# Patient Record
Sex: FEMALE | Race: WHITE | URBAN - NONMETROPOLITAN AREA
[De-identification: names, ages, dates, MRNs, and addresses within clinical notes are randomized per-mention and may not be internally consistent; named-entity substitution may affect disease eponyms.]

---

## 2020-10-14 ENCOUNTER — TELEPHONE (OUTPATIENT)
Dept: OBGYN CLINIC | Age: 30
End: 2020-10-14

## 2020-10-14 ENCOUNTER — HOSPITAL ENCOUNTER (OUTPATIENT)
Age: 30
Setting detail: SPECIMEN
Discharge: HOME | End: 2020-10-14
Payer: COMMERCIAL

## 2020-10-14 ENCOUNTER — OFFICE VISIT (OUTPATIENT)
Dept: OBGYN CLINIC | Age: 30
End: 2020-10-14
Payer: COMMERCIAL

## 2020-10-14 VITALS
DIASTOLIC BLOOD PRESSURE: 75 MMHG | SYSTOLIC BLOOD PRESSURE: 134 MMHG | WEIGHT: 201.6 LBS | BODY MASS INDEX: 30.55 KG/M2 | HEIGHT: 68 IN

## 2020-10-14 DIAGNOSIS — N94.6 DYSMENORRHEA: Primary | ICD-10-CM

## 2020-10-14 LAB
HCG QUANTITATIVE: <1 IU/L
PROLACTIN: 92.57 UG/L (ref 4.79–23.3)
TSH SERPL DL<=0.05 MIU/L-ACNC: 1.19 MIU/L (ref 0.3–5)

## 2020-10-14 PROCEDURE — 99395 PREV VISIT EST AGE 18-39: CPT | Performed by: NURSE PRACTITIONER

## 2020-10-14 RX ORDER — NORGESTIMATE AND ETHINYL ESTRADIOL 0.25-0.035
1 KIT ORAL DAILY
COMMUNITY
End: 2021-05-13 | Stop reason: ALTCHOICE

## 2020-10-14 SDOH — HEALTH STABILITY: MENTAL HEALTH: HOW OFTEN DO YOU HAVE A DRINK CONTAINING ALCOHOL?: MONTHLY OR LESS

## 2020-10-14 ASSESSMENT — PATIENT HEALTH QUESTIONNAIRE - PHQ9
SUM OF ALL RESPONSES TO PHQ9 QUESTIONS 1 & 2: 0
SUM OF ALL RESPONSES TO PHQ QUESTIONS 1-9: 0
1. LITTLE INTEREST OR PLEASURE IN DOING THINGS: 0
2. FEELING DOWN, DEPRESSED OR HOPELESS: 0
SUM OF ALL RESPONSES TO PHQ QUESTIONS 1-9: 0

## 2020-10-14 NOTE — PROGRESS NOTES
YEARLY PHYSICAL    Date of service: 10/14/2020    Raul Andrade  Is a 31 y.o.  female    PT's PCP is: No primary care provider on file. : 1990                                             Subjective:       Patient's last menstrual period was 10/03/2020 (approximate). Are your menses regular: yes    OB History    Para Term  AB Living   0 0 0 0 0 0   SAB TAB Ectopic Molar Multiple Live Births   0 0 0 0 0 0        Social History     Tobacco Use   Smoking Status Never Smoker   Smokeless Tobacco Never Used        Social History     Substance and Sexual Activity   Alcohol Use Yes    Frequency: Monthly or less       Family History   Problem Relation Age of Onset    Hypertension Maternal Grandmother        Any family history of breast or ovarian cancer: No    Any family history of blood clots: No      Allergies: No known allergies      Current Outpatient Medications:     Nebivolol HCl (BYSTOLIC PO), Take by mouth, Disp: , Rfl:     norgestimate-ethinyl estradiol ([address] [address]) 0.25-35 MG-MCG per tablet, Take 1 tablet by mouth daily, Disp: , Rfl:     Social History     Substance and Sexual Activity   Sexual Activity Yes    Partners: Male    Birth control/protection: Pill     Last Yearly:  10/2019    Last pap: 10/2019    Last HPV: 10/2019    Last Mammogram: n/a    Last Dexascan n/a    Do you do self breast exams: Yes    Past Medical History:   Diagnosis Date    Abnormal Pap smear of cervix , 2018    HGSIL on 2017 colpo    HPV (human papilloma virus) anogenital infection     Hypertension        Past Surgical History:   Procedure Laterality Date    LEEP         Family History   Problem Relation Age of Onset    Hypertension Maternal Grandmother        Chief Complaint   Patient presents with    Gynecologic Exam     Pap due. C/O bilateral nipple discharge that is white/clear in color x 8 mos. Denies pain.           PE: Masses absent                                    Vaginal discharge: no vaginal discharge                            Assessment and Plan          Diagnosis Orders   1. Women's annual routine gynecological examination  PAP SMEAR   2. Galactorrhea not associated with childbirth  TSH With Reflex Ft4    hCG, Quantitative, Pregnancy    Prolactin    PAP Smear    PAP Smear    US BREAST COMPLETE RIGHT    US BREASt COMPLETE LEFT   3. Dysmenorrhea         Patient has complaints of bilateral spontaneous nipple discharge x8 months. Discussed further evaluation with labs, breast paps collected and will be sent to pathology, bilateral breast usn ordered. Patient agreeable to plan. Discussed avoiding all stimulation to breast.     Patient recently diagnosed with HTN. Discussed LILO's MEC category 3 and risks associated with use likely outweigh benefits. Discussed progesterone only options including POP's, Depo, nexplanon, or IUD. Patient would like to move forth with IUD. Discussed procedure, risks/benefits, bleeding patterns. I am having Chuy Duncan maintain her Nebivolol HCl (BYSTOLIC PO) and norgestimate-ethinyl estradiol. Return in about 1 year (around 10/14/2021) for yearly, needs bilateral breast usn for nipple discharge!! . She was also counseled on her preventative health maintenance recommendations and follow-up. There are no Patient Instructions on file for this visit.     Juvenal Rojas,10/14/2020 9:25 AM

## 2020-10-15 ENCOUNTER — TELEPHONE (OUTPATIENT)
Dept: OBGYN CLINIC | Age: 30
End: 2020-10-15

## 2020-10-15 NOTE — TELEPHONE ENCOUNTER
Can you please send order for brain MRI with contrast to [facility].  Patient with hyperprolactinemia and galactorrhea

## 2020-10-15 NOTE — TELEPHONE ENCOUNTER
Pt signed the mirena/antena form. Your note says she wants Eva Mcgregor. Can you please confirm which one.

## 2020-10-15 NOTE — TELEPHONE ENCOUNTER
Benefit verification request faxed to [facility].   If we order through specialty pharm she will need to sign a  liletta form at that time

## 2020-10-16 LAB
CYTOLOGY REPORT: NORMAL
SURGICAL PATHOLOGY REPORT: NORMAL

## 2020-10-22 RX ORDER — ACETAMINOPHEN AND CODEINE PHOSPHATE 120; 12 MG/5ML; MG/5ML
1 SOLUTION ORAL DAILY
Qty: 28 TABLET | Refills: 12 | Status: SHIPPED | OUTPATIENT
Start: 2020-10-22 | End: 2021-08-19 | Stop reason: SDUPTHER

## 2020-10-22 NOTE — TELEPHONE ENCOUNTER
I called pt and informed her that rx was sent to the pharm. Instructed her to finish current pack and then start new and to take at same time each day.

## 2020-10-22 NOTE — TELEPHONE ENCOUNTER
Fax received from [facility]. Pt's deductible of $250 applies. After deductible is met device and insert covered at 80%. I called pt and informed her that her estimated out of pocket cost would be approx $550. Pt. Voiced that she wants to do a pill instead. What do you recommend? She expects to start a cycle in about 4 days. [facility] in [facility] In your  Visit note you mentioned a progesterone only pill because of HTN.

## 2020-11-03 ENCOUNTER — TELEPHONE (OUTPATIENT)
Dept: OBGYN | Age: 30
End: 2020-11-03

## 2020-11-03 DIAGNOSIS — R79.89 ELEVATED PROLACTIN LEVEL: Primary | ICD-10-CM

## 2020-11-03 NOTE — TELEPHONE ENCOUNTER
Order faxed.
Have Your Spot(S) Been Treated In The Past?: has not been treated
Hpi Title: Evaluation of a Skin Lesion
Year Removed: 1900
Hpi Title: Evaluation of Skin Lesions
Year Removed: 1900

## 2020-12-10 ENCOUNTER — TELEPHONE (OUTPATIENT)
Dept: OBGYN CLINIC | Age: 30
End: 2020-12-10

## 2020-12-10 NOTE — TELEPHONE ENCOUNTER
Please notify patient of results and apologize for delay. Results were never faxed here, we had to request them. Finding of brain MRI are suspicious for cystic pituitary microadenoma- which can be reason for nipple discharge and elevated prolactin. I would like her to start with referral to endocrinology for follow up and management. Please fax referral to Dr. Dyana Thompson. Thanks!

## 2021-05-13 ENCOUNTER — HOSPITAL ENCOUNTER (OUTPATIENT)
Age: 31
Setting detail: SPECIMEN
Discharge: HOME | End: 2021-05-13
Payer: COMMERCIAL

## 2021-05-13 ENCOUNTER — OFFICE VISIT (OUTPATIENT)
Dept: OBGYN CLINIC | Age: 31
End: 2021-05-13
Payer: COMMERCIAL

## 2021-05-13 VITALS — SYSTOLIC BLOOD PRESSURE: 124 MMHG | WEIGHT: 199.4 LBS | BODY MASS INDEX: 30.32 KG/M2 | DIASTOLIC BLOOD PRESSURE: 81 MMHG

## 2021-05-13 DIAGNOSIS — N93.9 ABNORMAL UTERINE AND VAGINAL BLEEDING, UNSPECIFIED: ICD-10-CM

## 2021-05-13 DIAGNOSIS — N93.9 ABNORMAL UTERINE AND VAGINAL BLEEDING, UNSPECIFIED: Primary | ICD-10-CM

## 2021-05-13 LAB — HCG QUANTITATIVE: <1 IU/L

## 2021-05-13 PROCEDURE — 1036F TOBACCO NON-USER: CPT | Performed by: NURSE PRACTITIONER

## 2021-05-13 PROCEDURE — 99213 OFFICE O/P EST LOW 20 MIN: CPT | Performed by: NURSE PRACTITIONER

## 2021-05-13 PROCEDURE — G8427 DOCREV CUR MEDS BY ELIG CLIN: HCPCS | Performed by: NURSE PRACTITIONER

## 2021-05-13 PROCEDURE — G8417 CALC BMI ABV UP PARAM F/U: HCPCS | Performed by: NURSE PRACTITIONER

## 2021-05-13 RX ORDER — CABERGOLINE 0.5 MG/1
TABLET ORAL
COMMUNITY
Start: 2021-05-03

## 2021-05-13 NOTE — PROGRESS NOTES
PROBLEM VISIT     Date of service: 2021    Patrick Tiwari  Is a 36 y.o.  female    PT's PCP is: No primary care provider on file. : 1990                                             Subjective:       Patient's last menstrual period was 2021. OB History    Para Term  AB Living   0 0 0 0 0 0   SAB TAB Ectopic Molar Multiple Live Births   0 0 0 0 0 0        Social History     Tobacco Use   Smoking Status Never Smoker   Smokeless Tobacco Never Used        Social History     Substance and Sexual Activity   Alcohol Use Yes    Frequency: Monthly or less       Allergies: No known allergies      Current Outpatient Medications:     cabergoline (DOSTINEX) 0.5 MG tablet, , Disp: , Rfl:     norethindrone (ORTHO MICRONOR) 0.35 MG tablet, Take 1 tablet by mouth daily, Disp: 28 tablet, Rfl: 12    Social History     Substance and Sexual Activity   Sexual Activity Yes    Partners: Male    Birth control/protection: Pill       Chief Complaint   Patient presents with    Menstrual Problem     C/O frequent menses stattes will randomly have bleeding, sometimes heavy, sometimes light and sometimes just spotting. States onset was around the time of starting Cabergoline. PE:  Vital Signs  Blood pressure 124/81, weight 199 lb 6.4 oz (90.4 kg), last menstrual period 2021. HPI: Patient presents today with complaints of irregular vaginal bleeding for the past 3 months. Unsure if the irregular bleeding is related to Cabergoline as it started around the same time she started the medication. Denies any s/s of vaginal infection or change in sexual partners. Admits to skipping POP's if she starts bleeding in middle of the pack. She will then just start a new pack. PT denies fever, chills, nausea and vomiting       Review of Systems   Constitutional: Negative. Respiratory: Negative. Cardiovascular: Negative. Genitourinary: Positive for menstrual problem.  Negative for dyspareunia, dysuria, frequency, pelvic pain and vaginal discharge. Neurological: Negative. Physical Exam  Constitutional:       Appearance: Normal appearance. HENT:      Head: Normocephalic. Pulmonary:      Effort: Pulmonary effort is normal.   Musculoskeletal: Normal range of motion. Neurological:      General: No focal deficit present. Mental Status: She is alert. Psychiatric:         Mood and Affect: Mood normal.         Behavior: Behavior normal.         Assessment and Plan          Diagnosis Orders   1. Abnormal uterine and vaginal bleeding, unspecified  hCG, Quantitative, Pregnancy     Discussed irregular bleeding is a common side effect of POP's when not taken consistently. With normal usn will consider switching POP's back to LILO's as patient is not taking any medication to control BP and was only on antihypertensives x1 month. BP is appropriate today. I have discontinued Sangita Bernarda Nebivolol HCl (BYSTOLIC PO) and norgestimate-ethinyl estradiol. I am also having her maintain her norethindrone and cabergoline. Return for gyn US and follow up . There are no Patient Instructions on file for this visit.     Kadi Rojas,5/14/2021 8:32 AM

## 2021-05-14 ASSESSMENT — ENCOUNTER SYMPTOMS: RESPIRATORY NEGATIVE: 1

## 2021-05-18 ENCOUNTER — OFFICE VISIT (OUTPATIENT)
Dept: OBGYN CLINIC | Age: 31
End: 2021-05-18
Payer: COMMERCIAL

## 2021-05-18 VITALS — WEIGHT: 199.4 LBS | BODY MASS INDEX: 30.32 KG/M2 | SYSTOLIC BLOOD PRESSURE: 108 MMHG | DIASTOLIC BLOOD PRESSURE: 71 MMHG

## 2021-05-18 DIAGNOSIS — N94.6 DYSMENORRHEA: Primary | ICD-10-CM

## 2021-05-18 PROCEDURE — G8427 DOCREV CUR MEDS BY ELIG CLIN: HCPCS | Performed by: NURSE PRACTITIONER

## 2021-05-18 PROCEDURE — 1036F TOBACCO NON-USER: CPT | Performed by: NURSE PRACTITIONER

## 2021-05-18 PROCEDURE — G8417 CALC BMI ABV UP PARAM F/U: HCPCS | Performed by: NURSE PRACTITIONER

## 2021-05-18 PROCEDURE — 99213 OFFICE O/P EST LOW 20 MIN: CPT | Performed by: NURSE PRACTITIONER

## 2021-05-18 RX ORDER — NORGESTIMATE AND ETHINYL ESTRADIOL 0.25-0.035
1 KIT ORAL DAILY
Qty: 1 PACKET | Refills: 3 | Status: SHIPPED | OUTPATIENT
Start: 2021-05-18 | End: 2021-09-13

## 2021-05-18 ASSESSMENT — ENCOUNTER SYMPTOMS: RESPIRATORY NEGATIVE: 1

## 2021-05-18 NOTE — PROGRESS NOTES
PROBLEM VISIT     Date of service: 2021    Olesya Sinclair  Is a 36 y.o. female    PT's PCP is: No primary care provider on file. : 1990                                             Subjective:       Patient's last menstrual period was 2021. OB History    Para Term  AB Living   0 0 0 0 0 0   SAB TAB Ectopic Molar Multiple Live Births   0 0 0 0 0 0        Social History     Tobacco Use   Smoking Status Never Smoker   Smokeless Tobacco Never Used        Social History     Substance and Sexual Activity   Alcohol Use Yes       Allergies: No known allergies      Current Outpatient Medications:     cabergoline (DOSTINEX) 0.5 MG tablet, , Disp: , Rfl:     norethindrone (ORTHO MICRONOR) 0.35 MG tablet, Take 1 tablet by mouth daily, Disp: 28 tablet, Rfl: 12    Social History     Substance and Sexual Activity   Sexual Activity Yes    Partners: Male    Birth control/protection: Pill     Chief Complaint   Patient presents with    Follow-up     Follow up usn for AUB. PE:  Vital Signs  Blood pressure 108/71, weight 199 lb 6.4 oz (90.4 kg), last menstrual period 2021. HPI: Patient presents following ultrasound for AUB. Reports irregular bleeding on POP's for the past 3 months. The amount of vaginal bleeding varies from spotting to heavy. Was skipping last week in pill pack. PT denies fever, chills, nausea and vomiting       Review of Systems   Constitutional: Negative. Respiratory: Negative. Cardiovascular: Negative. Genitourinary: Positive for menstrual problem. Negative for dyspareunia, dysuria, frequency, pelvic pain, vaginal bleeding and vaginal discharge. Neurological: Negative. Physical Exam  Constitutional:       Appearance: Normal appearance. HENT:      Head: Normocephalic. Cardiovascular:      Rate and Rhythm: Normal rate and regular rhythm.    Pulmonary:      Effort: Pulmonary effort is normal.   Musculoskeletal:         General: Normal range of motion. Neurological:      General: No focal deficit present. Mental Status: She is alert. Psychiatric:         Mood and Affect: Mood normal.         Behavior: Behavior normal.         Assessment and Plan          Diagnosis Orders   1. Dysmenorrhea  norgestimate-ethinyl estradiol (ORTHO-CYCLEN, 28,) 0.25-35 MG-MCG per tablet     usn reviewed- uterus measures 7.9 x 4.5 x 3.9cm, endo lining 7.3SH, dominant follicle left ovary, right ovaries appear normal.     Will return to OCP as her BP has remained stable following discontinuation of antihypertensive. States she only took x1 month. Again today her BP is appropriate. Reviewed risks/benefits, administration, common side effects and bleeding patters, aches. Will finish current pack and then start [address] [place] [address]. I am having Andrea Pedroza start on norgestimate-ethinyl estradiol. I am also having her maintain her norethindrone and cabergoline. Return in about 3 months (around 8/18/2021). There are no Patient Instructions on file for this visit.     NANCY Kirkland NP,5/18/2021 8:36 PM

## 2021-08-19 ENCOUNTER — OFFICE VISIT (OUTPATIENT)
Dept: OBGYN CLINIC | Age: 31
End: 2021-08-19
Payer: COMMERCIAL

## 2021-08-19 VITALS — WEIGHT: 193.2 LBS | DIASTOLIC BLOOD PRESSURE: 77 MMHG | BODY MASS INDEX: 29.38 KG/M2 | SYSTOLIC BLOOD PRESSURE: 120 MMHG

## 2021-08-19 DIAGNOSIS — N94.6 DYSMENORRHEA: Primary | ICD-10-CM

## 2021-08-19 PROCEDURE — G8427 DOCREV CUR MEDS BY ELIG CLIN: HCPCS | Performed by: NURSE PRACTITIONER

## 2021-08-19 PROCEDURE — 1036F TOBACCO NON-USER: CPT | Performed by: NURSE PRACTITIONER

## 2021-08-19 PROCEDURE — 99213 OFFICE O/P EST LOW 20 MIN: CPT | Performed by: NURSE PRACTITIONER

## 2021-08-19 PROCEDURE — G8417 CALC BMI ABV UP PARAM F/U: HCPCS | Performed by: NURSE PRACTITIONER

## 2021-08-19 ASSESSMENT — ENCOUNTER SYMPTOMS
GASTROINTESTINAL NEGATIVE: 1
RESPIRATORY NEGATIVE: 1

## 2021-08-20 NOTE — PROGRESS NOTES
PROBLEM VISIT     Date of service: 2021    Mynor Ronquillo  Is a 36 y.o. female    PT's PCP is: No primary care provider on file. : 1990                                             Subjective:       Patient's last menstrual period was 2021. OB History    Para Term  AB Living   0 0 0 0 0 0   SAB TAB Ectopic Molar Multiple Live Births   0 0 0 0 0 0        Social History     Tobacco Use   Smoking Status Never Smoker   Smokeless Tobacco Never Used        Social History     Substance and Sexual Activity   Alcohol Use Yes       Allergies: No known allergies      Current Outpatient Medications:     norgestimate-ethinyl estradiol (ORTHO-CYCLEN, 28,) 0.25-35 MG-MCG per tablet, Take 1 tablet by mouth daily, Disp: 1 packet, Rfl: 3    cabergoline (DOSTINEX) 0.5 MG tablet, , Disp: , Rfl:     Social History     Substance and Sexual Activity   Sexual Activity Yes    Partners: Male    Birth control/protection: Pill     Chief Complaint   Patient presents with    Follow-up     Follow up sprintec for dysmenorrhea and AUB. Reports feeling well and everything is pretty much back to normal. Voices no concerns. PE:  Vital Signs  Blood pressure 120/77, weight 193 lb 3.2 oz (87.6 kg), last menstrual period 2021. HPI: Patient presents today for medication check. Reports irregular spotting and cramping improved with Sprintec. Denies any concerns. PT denies fever, chills, nausea and vomiting       Review of Systems   Constitutional: Negative. Respiratory: Negative. Cardiovascular: Negative. Gastrointestinal: Negative. Genitourinary: Negative. Neurological: Negative. Physical Exam  Constitutional:       Appearance: Normal appearance. HENT:      Head: Normocephalic. Pulmonary:      Effort: Pulmonary effort is normal.   Musculoskeletal:         General: Normal range of motion. Neurological:      General: No focal deficit present.       Mental Status:

## 2021-09-13 DIAGNOSIS — N94.6 DYSMENORRHEA: ICD-10-CM

## 2021-11-09 DIAGNOSIS — N94.6 DYSMENORRHEA: ICD-10-CM

## 2021-11-09 RX ORDER — NORGESTIMATE AND ETHINYL ESTRADIOL 0.25-0.035
KIT ORAL
Qty: 28 TABLET | Refills: 1 | Status: CANCELLED | OUTPATIENT
Start: 2021-11-09

## 2021-12-06 DIAGNOSIS — N94.6 DYSMENORRHEA: ICD-10-CM

## 2021-12-06 RX ORDER — NORGESTIMATE AND ETHINYL ESTRADIOL 0.25-0.035
1 KIT ORAL DAILY
Qty: 28 TABLET | Refills: 0 | Status: SHIPPED | OUTPATIENT
Start: 2021-12-06 | End: 2021-12-23 | Stop reason: SDUPTHER

## 2021-12-23 ENCOUNTER — OFFICE VISIT (OUTPATIENT)
Dept: OBGYN CLINIC | Age: 31
End: 2021-12-23
Payer: COMMERCIAL

## 2021-12-23 VITALS
DIASTOLIC BLOOD PRESSURE: 76 MMHG | WEIGHT: 191 LBS | SYSTOLIC BLOOD PRESSURE: 126 MMHG | HEIGHT: 68 IN | BODY MASS INDEX: 28.95 KG/M2

## 2021-12-23 DIAGNOSIS — Z01.419 WOMEN'S ANNUAL ROUTINE GYNECOLOGICAL EXAMINATION: Primary | ICD-10-CM

## 2021-12-23 DIAGNOSIS — N94.6 DYSMENORRHEA: ICD-10-CM

## 2021-12-23 PROCEDURE — 99395 PREV VISIT EST AGE 18-39: CPT | Performed by: NURSE PRACTITIONER

## 2021-12-23 PROCEDURE — G8484 FLU IMMUNIZE NO ADMIN: HCPCS | Performed by: NURSE PRACTITIONER

## 2021-12-23 RX ORDER — NORGESTIMATE AND ETHINYL ESTRADIOL 0.25-0.035
1 KIT ORAL DAILY
Qty: 28 TABLET | Refills: 11 | Status: SHIPPED | OUTPATIENT
Start: 2021-12-23 | End: 2022-10-03

## 2021-12-23 ASSESSMENT — ENCOUNTER SYMPTOMS
CONSTIPATION: 0
DIARRHEA: 0
ABDOMINAL PAIN: 0
SHORTNESS OF BREATH: 0

## 2021-12-23 NOTE — PROGRESS NOTES
YEARLY PHYSICAL    Date of service: 2021    Coby Najera  Is a 36 y.o.  engaged female    PT's PCP is: No primary care provider on file. : 1990                                         Chaperone for Intimate Exam   Chaperone was offered as part of the rooming process. Patient declined and agrees to continue with exam without a chaperone.  Chaperone: n/a      Subjective:       Patient's last menstrual period was 2021 (approximate). Are your menses regular: yes    OB History    Para Term  AB Living   0 0 0 0 0 0   SAB IAB Ectopic Molar Multiple Live Births   0 0 0 0 0 0        Social History     Tobacco Use   Smoking Status Never Smoker   Smokeless Tobacco Never Used        Social History     Substance and Sexual Activity   Alcohol Use Yes    Comment: very rarely       Family History   Problem Relation Age of Onset    Hypertension Maternal Grandmother        Any family history of breast or ovarian cancer: No    Any family history of blood clots: No      Allergies: No known allergies      Current Outpatient Medications:     norgestimate-ethinyl estradiol (SPRINTEC 28) 0.25-35 MG-MCG per tablet, Take 1 tablet by mouth daily, Disp: 28 tablet, Rfl: 11    cabergoline (DOSTINEX) 0.5 MG tablet, , Disp: , Rfl:     Social History     Substance and Sexual Activity   Sexual Activity Yes    Partners: Male    Birth control/protection: Pill       Any bleeding or pain with intercourse: No    Last Yearly:  10-14-20    Last pap: 10-14-20 NL    Do you do self breast exams: No    Past Medical History:   Diagnosis Date    Abnormal Pap smear of cervix ,     HGSIL on 2017 colpo    Cyst of pituitary gland ([facility] [address]     gets yearly MRI. Dr. Anson Macario follows.     HPV (human papilloma virus) anogenital infection     Hypertension        Past Surgical History:   Procedure Laterality Date    LEEP         Family General: Normal range of motion. Neurological:      General: No focal deficit present. Mental Status: She is alert. Psychiatric:         Mood and Affect: Mood normal.         Behavior: Behavior normal.                          Assessment and Plan          Diagnosis Orders   1. Women's annual routine gynecological examination     2. Dysmenorrhea  norgestimate-ethinyl estradiol (SPRINTEC 28) 0.25-35 MG-MCG per tablet       Repeat Annual every 1 year  Cervical Cytology Evaluation begins at 24years old. If Negative Cytology, Follow-up screening per current guidelines. Mammograms every 1year. If 35 yo and last mammogram was negative. Routine healthmaintenance per patients PCP. Happy with current OCP, desires refill. Reviewed risks/benefits, aches       I am having Red Azeri maintain her cabergoline and norgestimate-ethinyl estradiol. Return in about 1 year (around 12/23/2022) for yearly. She was also counseled on her preventative health maintenance recommendations and follow-up. There are no Patient Instructions on file for this visit.     NANCY Painter NP,12/23/2021 8:37 AM

## 2022-10-03 DIAGNOSIS — N94.6 DYSMENORRHEA: ICD-10-CM

## 2023-03-21 ENCOUNTER — HOSPITAL ENCOUNTER (OUTPATIENT)
Age: 33
Setting detail: SPECIMEN
Discharge: HOME | End: 2023-03-21

## 2023-03-21 ENCOUNTER — OFFICE VISIT (OUTPATIENT)
Dept: OBGYN CLINIC | Age: 33
End: 2023-03-21
Payer: COMMERCIAL

## 2023-03-21 VITALS
BODY MASS INDEX: 29.1 KG/M2 | WEIGHT: 192 LBS | SYSTOLIC BLOOD PRESSURE: 112 MMHG | HEIGHT: 68 IN | DIASTOLIC BLOOD PRESSURE: 62 MMHG

## 2023-03-21 DIAGNOSIS — Z01.419 WOMEN'S ANNUAL ROUTINE GYNECOLOGICAL EXAMINATION: Primary | ICD-10-CM

## 2023-03-21 PROCEDURE — 99395 PREV VISIT EST AGE 18-39: CPT | Performed by: NURSE PRACTITIONER

## 2023-03-21 PROCEDURE — G8484 FLU IMMUNIZE NO ADMIN: HCPCS | Performed by: NURSE PRACTITIONER

## 2023-03-21 ASSESSMENT — ENCOUNTER SYMPTOMS
SHORTNESS OF BREATH: 0
ABDOMINAL PAIN: 0
CONSTIPATION: 0
DIARRHEA: 0

## 2023-03-21 NOTE — PROGRESS NOTES
tenderness. Left: No mass, nipple discharge, skin change or tenderness. HENT:      Head: Normocephalic and atraumatic. Eyes:      Extraocular Movements: Extraocular movements intact. Pupils: Pupils are equal, round, and reactive to light. Cardiovascular:      Rate and Rhythm: Normal rate. Pulmonary:      Effort: Pulmonary effort is normal.   Abdominal:      Palpations: Abdomen is soft. Tenderness: There is no abdominal tenderness. There is no guarding or rebound. Musculoskeletal:         General: Normal range of motion. Neurological:      General: No focal deficit present. Mental Status: She is alert and oriented to person, place, and time. Skin:     General: Skin is warm and dry. Psychiatric:         Mood and Affect: Mood normal.         Behavior: Behavior normal.                           Assessment & Plan  1. Women's annual routine gynecological examination  - PAP SMEAR; Future  Repeat Annual every 1 year  Cervical Cytology Evaluation begins at 24years old. If Negative Cytology, Follow-up screening per current guidelines. Mammograms every 1year. If 35 yo and last mammogram was negative. Routine healthmaintenance per patients PCP. Return in about 1 year (around 3/21/2024) for yearly.     Electronically Signed by NANCY Nielson NP

## 2023-03-23 LAB
HPV SAMPLE: NORMAL
HPV, GENOTYPE 16: NOT DETECTED
HPV, GENOTYPE 18: NOT DETECTED
HPV, HIGH RISK OTHER: NOT DETECTED
HPV, INTERPRETATION: NORMAL
SPECIMEN DESCRIPTION: NORMAL

## 2023-03-28 LAB — CYTOLOGY REPORT: NORMAL

## 2023-04-18 ENCOUNTER — HOSPITAL ENCOUNTER (OUTPATIENT)
Age: 33
Setting detail: SPECIMEN
Discharge: HOME | End: 2023-04-18

## 2023-04-18 DIAGNOSIS — Z32.01 PREGNANCY TEST POSITIVE: ICD-10-CM

## 2023-04-18 LAB — HCG QUANTITATIVE: <1 MIU/ML

## 2023-10-10 ENCOUNTER — OFFICE VISIT (OUTPATIENT)
Dept: OBGYN CLINIC | Age: 33
End: 2023-10-10
Payer: COMMERCIAL

## 2023-10-10 VITALS
SYSTOLIC BLOOD PRESSURE: 115 MMHG | BODY MASS INDEX: 28.04 KG/M2 | HEIGHT: 68 IN | WEIGHT: 185 LBS | DIASTOLIC BLOOD PRESSURE: 72 MMHG

## 2023-10-10 DIAGNOSIS — N91.1 AMENORRHEA, SECONDARY: Primary | ICD-10-CM

## 2023-10-10 PROCEDURE — 99213 OFFICE O/P EST LOW 20 MIN: CPT | Performed by: NURSE PRACTITIONER

## 2023-10-10 PROCEDURE — G8419 CALC BMI OUT NRM PARAM NOF/U: HCPCS | Performed by: NURSE PRACTITIONER

## 2023-10-10 PROCEDURE — 1036F TOBACCO NON-USER: CPT | Performed by: NURSE PRACTITIONER

## 2023-10-10 PROCEDURE — G8484 FLU IMMUNIZE NO ADMIN: HCPCS | Performed by: NURSE PRACTITIONER

## 2023-10-10 PROCEDURE — G8427 DOCREV CUR MEDS BY ELIG CLIN: HCPCS | Performed by: NURSE PRACTITIONER

## 2023-10-10 NOTE — PROGRESS NOTES
PROBLEM VISIT     Date of service: 10/10/2023    Karen Malcolm  Is a 34 y.o. female    PT's PCP is: No primary care provider on file. : 1990                                             Subjective:       Patient's last menstrual period was 08/10/2023. OB History    Para Term  AB Living   2 0 0 0 1 0   SAB IAB Ectopic Molar Multiple Live Births   1 0 0 0 0 0      # Outcome Date GA Lbr Ugo/2nd Weight Sex Delivery Anes PTL Lv   2 Current            1 SAB 2023                Social History     Tobacco Use   Smoking Status Former    Types: Cigarettes   Smokeless Tobacco Never        Social History     Substance and Sexual Activity   Alcohol Use Not Currently    Comment: very rarely       Allergies: No known allergies      Current Outpatient Medications:     Prenatal Vit-Fe Fumarate-FA (PRENATAL VITAMIN PO), Take by mouth, Disp: , Rfl:     cabergoline (DOSTINEX) 0.5 MG tablet, , Disp: , Rfl:     Social History     Substance and Sexual Activity   Sexual Activity Yes    Partners: Male         Chief Complaint   Patient presents with    Follow-up     Follow up viability usn. Reports nausea. PE:  Vital Signs  Blood pressure 115/72, height 5' 8\" (1.727 m), weight 185 lb (83.9 kg), last menstrual period 08/10/2023. HPI: Patient presents today following viability ultrasound. Mild nausea; manageable at this time. Denies vaginal bleeding and abdominal pain. PT denies fever, chills, nausea and vomiting       Review of Systems   Constitutional: Negative. Respiratory: Negative. Cardiovascular: Negative. Gastrointestinal:  Positive for nausea. Negative for constipation, diarrhea and vomiting. Genitourinary:  Positive for menstrual problem (amenorrhea, pregnant). Negative for difficulty urinating, dysuria, frequency, pelvic pain, vaginal discharge and vaginal pain. Neurological: Negative.         Physical Exam  Constitutional:       General: She is not in acute

## 2023-10-16 ENCOUNTER — HOSPITAL ENCOUNTER (OUTPATIENT)
Age: 33
Setting detail: SPECIMEN
Discharge: HOME | End: 2023-10-16

## 2023-10-16 ENCOUNTER — INITIAL PRENATAL (OUTPATIENT)
Dept: OBGYN CLINIC | Age: 33
End: 2023-10-16

## 2023-10-16 VITALS
SYSTOLIC BLOOD PRESSURE: 116 MMHG | HEIGHT: 68 IN | DIASTOLIC BLOOD PRESSURE: 62 MMHG | BODY MASS INDEX: 28.25 KG/M2 | WEIGHT: 186.4 LBS

## 2023-10-16 DIAGNOSIS — O30.041 DICHORIONIC DIAMNIOTIC TWIN PREGNANCY IN FIRST TRIMESTER: Primary | ICD-10-CM

## 2023-10-16 DIAGNOSIS — Z86.79 HISTORY OF HYPERTENSION: ICD-10-CM

## 2023-10-16 DIAGNOSIS — O30.041 DICHORIONIC DIAMNIOTIC TWIN PREGNANCY IN FIRST TRIMESTER: ICD-10-CM

## 2023-10-16 LAB
ABO + RH BLD: NORMAL
ALBUMIN SERPL-MCNC: 4 G/DL (ref 3.5–5.2)
ALBUMIN/GLOB SERPL: 1.2 {RATIO} (ref 1–2.5)
ALP SERPL-CCNC: 52 U/L (ref 35–104)
ALT SERPL-CCNC: 9 U/L (ref 5–33)
AMPHET UR QL SCN: NEGATIVE
ANION GAP SERPL CALCULATED.3IONS-SCNC: 10 MMOL/L (ref 9–17)
AST SERPL-CCNC: 12 U/L
BARBITURATES UR QL SCN: NEGATIVE
BASOPHILS # BLD: 0.05 K/UL (ref 0–0.2)
BASOPHILS NFR BLD: 1 % (ref 0–2)
BENZODIAZ UR QL: NEGATIVE
BILIRUB SERPL-MCNC: 0.3 MG/DL (ref 0.3–1.2)
BILIRUB UR QL STRIP: NEGATIVE
BLOOD GROUP ANTIBODIES SERPL: NEGATIVE
BUN SERPL-MCNC: 9 MG/DL (ref 6–20)
CALCIUM SERPL-MCNC: 9 MG/DL (ref 8.6–10.4)
CANNABINOIDS UR QL SCN: NEGATIVE
CHLORIDE SERPL-SCNC: 101 MMOL/L (ref 98–107)
CLARITY UR: CLEAR
CO2 SERPL-SCNC: 24 MMOL/L (ref 20–31)
COCAINE UR QL SCN: NEGATIVE
COLOR UR: YELLOW
COMMENT: NORMAL
CREAT SERPL-MCNC: 0.5 MG/DL (ref 0.5–0.9)
EOSINOPHIL # BLD: 0.14 K/UL (ref 0–0.44)
EOSINOPHILS RELATIVE PERCENT: 2 % (ref 1–4)
ERYTHROCYTE [DISTWIDTH] IN BLOOD BY AUTOMATED COUNT: 11.9 % (ref 11.8–14.4)
FENTANYL UR QL: NEGATIVE
GFR SERPL CREATININE-BSD FRML MDRD: >60 ML/MIN/1.73M2
GLUCOSE SERPL-MCNC: 92 MG/DL (ref 70–99)
GLUCOSE UR STRIP-MCNC: NEGATIVE MG/DL
HBV SURFACE AG SERPL QL IA: NONREACTIVE
HCT VFR BLD AUTO: 37 % (ref 36.3–47.1)
HCV AB SERPL QL IA: NONREACTIVE
HGB BLD-MCNC: 12.3 G/DL (ref 11.9–15.1)
HGB UR QL STRIP.AUTO: NEGATIVE
IMM GRANULOCYTES # BLD AUTO: 0.03 K/UL (ref 0–0.3)
IMM GRANULOCYTES NFR BLD: 0 %
KETONES UR STRIP-MCNC: NEGATIVE MG/DL
LEUKOCYTE ESTERASE UR QL STRIP: NEGATIVE
LYMPHOCYTES NFR BLD: 1.62 K/UL (ref 1.1–3.7)
LYMPHOCYTES RELATIVE PERCENT: 19 % (ref 24–43)
MCH RBC QN AUTO: 30.1 PG (ref 25.2–33.5)
MCHC RBC AUTO-ENTMCNC: 33.2 G/DL (ref 28.4–34.8)
MCV RBC AUTO: 90.5 FL (ref 82.6–102.9)
METHADONE UR QL: NEGATIVE
MONOCYTES NFR BLD: 0.59 K/UL (ref 0.1–1.2)
MONOCYTES NFR BLD: 7 % (ref 3–12)
NEUTROPHILS NFR BLD: 71 % (ref 36–65)
NEUTS SEG NFR BLD: 6.05 K/UL (ref 1.5–8.1)
NITRITE UR QL STRIP: NEGATIVE
NRBC BLD-RTO: 0 PER 100 WBC
OPIATES UR QL SCN: NEGATIVE
OXYCODONE UR QL SCN: NEGATIVE
PCP UR QL SCN: NEGATIVE
PH UR STRIP: 6.5 [PH] (ref 5–8)
PLATELET # BLD AUTO: 220 K/UL (ref 138–453)
PMV BLD AUTO: 11.5 FL (ref 8.1–13.5)
POTASSIUM SERPL-SCNC: 4.1 MMOL/L (ref 3.7–5.3)
PROT SERPL-MCNC: 7.3 G/DL (ref 6.4–8.3)
PROT UR STRIP-MCNC: NEGATIVE MG/DL
RBC # BLD AUTO: 4.09 M/UL (ref 3.95–5.11)
RUBV IGG SERPL QL IA: 30.97 IU/ML
SODIUM SERPL-SCNC: 135 MMOL/L (ref 135–144)
SP GR UR STRIP: 1.02 (ref 1–1.03)
T PALLIDUM AB SER QL IA: NONREACTIVE
TEST INFORMATION: NORMAL
URATE SERPL-MCNC: 3.6 MG/DL (ref 2.4–5.7)
UROBILINOGEN UR STRIP-ACNC: NORMAL EU/DL (ref 0–1)
WBC OTHER # BLD: 8.5 K/UL (ref 3.5–11.3)

## 2023-10-16 PROCEDURE — 0500F INITIAL PRENATAL CARE VISIT: CPT | Performed by: NURSE PRACTITIONER

## 2023-10-16 NOTE — PROGRESS NOTES
Here for PNI. Di/Di twin pregnancy. No complaints today. Denies hx of MRSA. Desires CFDNA and MSAFP. CFDNA drawn today with . Hx HTN but states once she lost weight her BP is normal. Baseline BP labs drawn today. Has USN and NOB appt 10/24. Questions answered and forms signed.

## 2023-10-17 LAB
CHLAMYDIA DNA UR QL NAA+PROBE: NEGATIVE
CREAT UR-MCNC: 168.6 MG/DL (ref 28–217)
HIV 1+2 AB+HIV1 P24 AG SERPL QL IA: NONREACTIVE
MICROORGANISM SPEC CULT: NORMAL
N GONORRHOEA DNA UR QL NAA+PROBE: NEGATIVE
SPECIMEN DESCRIPTION: NORMAL
SPECIMEN DESCRIPTION: NORMAL
TOTAL PROTEIN, URINE: 11 MG/DL
URINE TOTAL PROTEIN CREATININE RATIO: 0.07 (ref 0–0.2)

## 2023-10-17 ASSESSMENT — ENCOUNTER SYMPTOMS
NAUSEA: 1
RESPIRATORY NEGATIVE: 1
VOMITING: 0
DIARRHEA: 0
CONSTIPATION: 0

## 2023-10-24 ENCOUNTER — INITIAL PRENATAL (OUTPATIENT)
Dept: OBGYN CLINIC | Age: 33
End: 2023-10-24

## 2023-10-24 VITALS — SYSTOLIC BLOOD PRESSURE: 116 MMHG | BODY MASS INDEX: 29.04 KG/M2 | WEIGHT: 191 LBS | DIASTOLIC BLOOD PRESSURE: 70 MMHG

## 2023-10-24 DIAGNOSIS — Z86.79 HISTORY OF HYPERTENSION: ICD-10-CM

## 2023-10-24 DIAGNOSIS — Z98.890 HISTORY OF LEEP (LOOP ELECTROSURGICAL EXCISION PROCEDURE) OF CERVIX COMPLICATING PREGNANCY IN FIRST TRIMESTER: ICD-10-CM

## 2023-10-24 DIAGNOSIS — Z3A.12 12 WEEKS GESTATION OF PREGNANCY: ICD-10-CM

## 2023-10-24 DIAGNOSIS — O34.41 HISTORY OF LEEP (LOOP ELECTROSURGICAL EXCISION PROCEDURE) OF CERVIX COMPLICATING PREGNANCY IN FIRST TRIMESTER: ICD-10-CM

## 2023-10-24 DIAGNOSIS — O30.041 DICHORIONIC DIAMNIOTIC TWIN PREGNANCY IN FIRST TRIMESTER: Primary | ICD-10-CM

## 2023-10-24 PROCEDURE — 0500F INITIAL PRENATAL CARE VISIT: CPT | Performed by: NURSE PRACTITIONER

## 2023-10-25 NOTE — PROGRESS NOTES
Dayne Obrien is a 34 y.o. female 12w3d      The patient was seen and evaluated. Fetal movement was Absent . No contractions or leakage of fluid. Signs and symptoms of  labor as well as labor were reviewed. Genetic testing was ordered and reviewed. MSAFP was not ordered for a 15-20 week gestational age window.  Reviewed. Pap UTD. Dates were reviewed with the patient. An 18-22 week anatomy ultrasound has been ordered. The patient will return to the office for her next visit in 2 weeks. See antepartum flow sheet. Cervical length-4.6 cm       Assessment:  1. Dayne Obrien is a 34 y.o. female  2.   3. 12w3d    There are no problems to display for this patient. Diagnosis Orders   1. Dichorionic diamniotic twin pregnancy in first trimester        2. 12 weeks gestation of pregnancy        3. History of hypertension        4. History of LEEP (loop electrosurgical excision procedure) of cervix complicating pregnancy in first trimester              Plan:  Return in about 2 weeks (around 2023) for cervical length. .  There are no Patient Instructions on file for this visit.

## 2023-11-07 ENCOUNTER — ROUTINE PRENATAL (OUTPATIENT)
Dept: OBGYN CLINIC | Age: 33
End: 2023-11-07

## 2023-11-07 VITALS — BODY MASS INDEX: 28.92 KG/M2 | SYSTOLIC BLOOD PRESSURE: 118 MMHG | DIASTOLIC BLOOD PRESSURE: 70 MMHG | WEIGHT: 190.2 LBS

## 2023-11-07 DIAGNOSIS — O34.41 HISTORY OF LEEP (LOOP ELECTROSURGICAL EXCISION PROCEDURE) OF CERVIX COMPLICATING PREGNANCY IN FIRST TRIMESTER: ICD-10-CM

## 2023-11-07 DIAGNOSIS — O10.919 CHRONIC HYPERTENSION AFFECTING PREGNANCY: ICD-10-CM

## 2023-11-07 DIAGNOSIS — O30.042 DICHORIONIC DIAMNIOTIC TWIN PREGNANCY IN SECOND TRIMESTER: Primary | ICD-10-CM

## 2023-11-07 DIAGNOSIS — Z98.890 HISTORY OF LEEP (LOOP ELECTROSURGICAL EXCISION PROCEDURE) OF CERVIX COMPLICATING PREGNANCY IN FIRST TRIMESTER: ICD-10-CM

## 2023-11-07 PROCEDURE — 0502F SUBSEQUENT PRENATAL CARE: CPT | Performed by: ADVANCED PRACTICE MIDWIFE

## 2023-11-07 NOTE — PROGRESS NOTES
Alesia Rachel is a 34 y.o. female 14w3d      The patient was seen and evaluated. Fetal movement was Absent . No contractions or leakage of fluid. Signs and symptoms of  labor as well as labor were reviewed. Genetic testing was not ordered and reviewed. MSAFP was not ordered for a 15-20 week gestational age window.  Reviewed. Dates were reviewed with the patient. An 18-22 week anatomy ultrasound has been ordered. The patient will return to the office for her next visit in 4 weeks. See antepartum flow sheet. Hx of LEEP - CXL today 3.9 cm, stable, repeat in 4 weeks. Hx of CHTN - lost weight and was able to stop medications - recommended bASA, del at 38 weeks and twice/wk monitoring in third trimester. Assessment:  1. Alesia Rachel is a 34 y.o. female  2.   3. 14w3d    There are no problems to display for this patient. Diagnosis Orders   1. Dichorionic diamniotic twin pregnancy in second trimester        2. History of LEEP (loop electrosurgical excision procedure) of cervix complicating pregnancy in first trimester        3. Chronic hypertension affecting pregnancy              Plan:  Return in about 4 weeks (around 2023) for OB Follow Up, Pelvic USN. There are no Patient Instructions on file for this visit.

## 2023-11-21 ENCOUNTER — ROUTINE PRENATAL (OUTPATIENT)
Dept: OBGYN CLINIC | Age: 33
End: 2023-11-21

## 2023-11-21 ENCOUNTER — HOSPITAL ENCOUNTER (OUTPATIENT)
Age: 33
Setting detail: SPECIMEN
Discharge: HOME | End: 2023-11-21

## 2023-11-21 VITALS — WEIGHT: 195.8 LBS | SYSTOLIC BLOOD PRESSURE: 114 MMHG | BODY MASS INDEX: 29.77 KG/M2 | DIASTOLIC BLOOD PRESSURE: 72 MMHG

## 2023-11-21 DIAGNOSIS — Z36.9 ANTENATAL SCREENING ENCOUNTER: ICD-10-CM

## 2023-11-21 DIAGNOSIS — O30.042 DICHORIONIC DIAMNIOTIC TWIN PREGNANCY IN SECOND TRIMESTER: Primary | ICD-10-CM

## 2023-11-21 PROCEDURE — 0502F SUBSEQUENT PRENATAL CARE: CPT | Performed by: NURSE PRACTITIONER

## 2023-11-21 NOTE — PROGRESS NOTES
Maida Boeck is a 34 y.o. female 16w3d      The patient was seen and evaluated. Fetal movement was Absent. No contractions or leakage of fluid. Signs and symptoms of  labor as well as labor were reviewed. Genetic testing was ordered and reviewed. MSAFP was ordered for a 15-20 week gestational age window.  Reviewed. Dates were reviewed with the patient. An 18-22 week anatomy ultrasound has been ordered. The patient will return to the office for her next visit in 2 weeks for cervical length. See antepartum flow sheet. Needs cervical length in 2 weeks due to history of LEEP. Desires afp today, order placed  Patient to usn for FHTs    Assessment:  1. Maida Boeck is a 34 y.o. female  2.   3. 16w3d    There are no problems to display for this patient. Diagnosis Orders   1. Dichorionic diamniotic twin pregnancy in second trimester        2.  screening encounter  Alpha Fetoprotein, Maternal            Plan:  Return in about 2 weeks (around 2023) for cervical length and follow up . There are no Patient Instructions on file for this visit.

## 2023-11-23 LAB
AFP INTERPRETATION: NORMAL
AFP MOM: 3.34
AFP SPECIMEN: NORMAL
AFP: 101 NG/ML
DATE OF BIRTH: NORMAL
DATING METHOD: NORMAL
DETERMINED BY: NORMAL
DIABETIC: NO
DONOR EGG?: NO
DUE DATE: NORMAL
ESTIMATED DUE DATE: NORMAL
FAMILY HISTORY NTD: NO
GESTATIONAL AGE: NORMAL
IN VITRO FERTILIZATION: NO
INSULIN REQ DIABETES: NO
LAST MENSTRUAL PERIOD: NORMAL
MATERNAL AGE AT EDD: 32.1 YR
MATERNAL WEIGHT: 195
MONOCHORIONIC TWINS: NORMAL
NUMBER OF FETUSES: NORMAL
PATIENT WEIGHT UNITS: NORMAL
PATIENT WEIGHT: NORMAL
RACE (MATERNAL): NORMAL
RACE: NORMAL
REPEAT SPECIMEN?: NO
SMOKING: NO
SMOKING: NO
VALPROIC/CARBAMAZEP: NORMAL
ZZ NTE CLEAN UP: HISTORY: NO

## 2023-12-05 ENCOUNTER — ROUTINE PRENATAL (OUTPATIENT)
Dept: OBGYN CLINIC | Age: 33
End: 2023-12-05

## 2023-12-05 VITALS — BODY MASS INDEX: 30.65 KG/M2 | SYSTOLIC BLOOD PRESSURE: 112 MMHG | DIASTOLIC BLOOD PRESSURE: 74 MMHG | WEIGHT: 201.6 LBS

## 2023-12-05 DIAGNOSIS — Z3A.18 18 WEEKS GESTATION OF PREGNANCY: ICD-10-CM

## 2023-12-05 DIAGNOSIS — Z98.890 HISTORY OF LEEP (LOOP ELECTROSURGICAL EXCISION PROCEDURE) OF CERVIX COMPLICATING PREGNANCY IN SECOND TRIMESTER: ICD-10-CM

## 2023-12-05 DIAGNOSIS — O34.42 HISTORY OF LEEP (LOOP ELECTROSURGICAL EXCISION PROCEDURE) OF CERVIX COMPLICATING PREGNANCY IN SECOND TRIMESTER: ICD-10-CM

## 2023-12-05 DIAGNOSIS — O10.919 CHRONIC HYPERTENSION AFFECTING PREGNANCY: ICD-10-CM

## 2023-12-05 DIAGNOSIS — O30.042 DICHORIONIC DIAMNIOTIC TWIN PREGNANCY IN SECOND TRIMESTER: Primary | ICD-10-CM

## 2023-12-05 PROCEDURE — 0502F SUBSEQUENT PRENATAL CARE: CPT | Performed by: NURSE PRACTITIONER

## 2023-12-05 NOTE — PROGRESS NOTES
Kym Leahy is a 34 y.o. female 18w3d      The patient was seen and evaluated. Fetal movement was Present. No contractions or leakage of fluid. Signs and symptoms of  labor as well as labor were reviewed. Genetic testing was ordered and reviewed. MSAFP was ordered for a 15-20 week gestational age window.  Reviewed. Dates were reviewed with the patient. An 18-22 week anatomy ultrasound has been ordered. The patient will return to the office for her next visit in 2 weeks. See antepartum flow sheet. Considering primary c/s due to twins   Considering transfer of care due to delivery location of [place]    Assessment:  1. Kym Leahy is a 34 y.o. female  2.   3. 18w3d    There are no problems to display for this patient. Diagnosis Orders   1. Dichorionic diamniotic twin pregnancy in second trimester        2. 18 weeks gestation of pregnancy        3. History of LEEP (loop electrosurgical excision procedure) of cervix complicating pregnancy in second trimester        4. Chronic hypertension affecting pregnancy              Plan:  Return in about 2 weeks (around 2023) for OB 20 wk u/s. There are no Patient Instructions on file for this visit.

## 2024-01-04 ENCOUNTER — TELEPHONE (OUTPATIENT)
Dept: OBGYN CLINIC | Age: 34
End: 2024-01-04

## 2025-04-08 ENCOUNTER — OFFICE VISIT (OUTPATIENT)
Age: 35
End: 2025-04-08
Payer: COMMERCIAL

## 2025-04-08 VITALS
SYSTOLIC BLOOD PRESSURE: 114 MMHG | HEART RATE: 62 BPM | BODY MASS INDEX: 30.46 KG/M2 | WEIGHT: 201 LBS | HEIGHT: 68 IN | DIASTOLIC BLOOD PRESSURE: 84 MMHG

## 2025-04-08 DIAGNOSIS — E23.6: ICD-10-CM

## 2025-04-08 DIAGNOSIS — D35.2 PROLACTIN SECRETING PITUITARY ADENOMA (HCC): Primary | ICD-10-CM

## 2025-04-08 PROCEDURE — 99204 OFFICE O/P NEW MOD 45 MIN: CPT | Performed by: INTERNAL MEDICINE

## 2025-04-08 RX ORDER — CABERGOLINE 0.5 MG/1
0.5 TABLET ORAL
Qty: 8 TABLET | Refills: 2 | Status: SHIPPED | OUTPATIENT
Start: 2025-04-10

## 2025-04-08 ASSESSMENT — ENCOUNTER SYMPTOMS
SHORTNESS OF BREATH: 0
CHEST TIGHTNESS: 0
CONSTIPATION: 0
DIARRHEA: 0
VOMITING: 0
ABDOMINAL PAIN: 0
VOICE CHANGE: 0
NAUSEA: 0
COUGH: 0

## 2025-04-08 NOTE — PROGRESS NOTES
Procedures    Prolactin     Standing Status:   Future     Expected Date:   4/8/2025     Expiration Date:   4/8/2026       The risks and benefits of my recommendations, as well as other treatment options were discussed with the patient today. Questions were answered.  Follow up: 3 months and as needed.       Electronically signed by Nakita Johnson MD 4/8/2025 9:12 AM       **This report has been created using voice recognition software. It may   contain minor errors which are inherent in voice recognition technology.**

## 2025-04-08 NOTE — PATIENT INSTRUCTIONS
Continue cabergoline 0.5mg twice weekly as prescribed. Potential side effects reviewed.     Schedule an eye exam at earliest convenience.     Echocardiogram of heart to be considered to obtain baseline while on cabergoline.     Prolactin level ordered today, present for labs at earliest convenience and please have labs sent to [facility].    Will obtain MRI results from [facility].

## 2025-07-09 ENCOUNTER — OFFICE VISIT (OUTPATIENT)
Age: 35
End: 2025-07-09
Payer: COMMERCIAL

## 2025-07-09 VITALS
HEART RATE: 63 BPM | DIASTOLIC BLOOD PRESSURE: 64 MMHG | BODY MASS INDEX: 29.28 KG/M2 | SYSTOLIC BLOOD PRESSURE: 116 MMHG | HEIGHT: 68 IN | WEIGHT: 193.2 LBS | RESPIRATION RATE: 16 BRPM

## 2025-07-09 DIAGNOSIS — D35.2 PROLACTIN SECRETING PITUITARY ADENOMA (HCC): Primary | ICD-10-CM

## 2025-07-09 DIAGNOSIS — D49.7 PITUITARY TUMOR: ICD-10-CM

## 2025-07-09 PROCEDURE — 99214 OFFICE O/P EST MOD 30 MIN: CPT | Performed by: INTERNAL MEDICINE

## 2025-07-09 RX ORDER — CABERGOLINE 0.5 MG/1
0.5 TABLET ORAL
Qty: 8 TABLET | Refills: 2 | Status: SHIPPED | OUTPATIENT
Start: 2025-07-10

## 2025-07-09 NOTE — PROGRESS NOTES
[facility] [place] [facility]  [facility] END[facility]CR[facility]OLOG[facility]  [address]  [address]  [place] [address]  Dept: [phone]  Loc: [phone]     Visit Date: 7/9/2025    Rissa Ring is a 35 y.o. female who presents today for:  Chief Complaint   Patient presents with    Follow-up    pituitary     The patient (or guardian, if applicable) and other individuals in attendance with the patient were advised that Artificial Intelligence will be utilized during this visit to record, process the conversation to generate a clinical note, and support improvement of the AI technology. The patient (or guardian, if applicable) and other individuals in attendance at the appointment consented to the use of AI, including the recording.         Subjective:      HPI   History of Present Illness  The patient is a 35-year-old female who presents for hyperprolactinemia.    She has been dealing with elevated prolactin levels for approximately 5 years, which are managed with cabergoline 0.5 mg twice weekly. Her last MRI was conducted in 2023, prior to her pregnancy. She has undergone 2 or 3 MRIs in total. She reports no new medications since her last visit. She experiences headaches but maintains good vision, with her last eye exam being a few years ago. She has not had her vision checked since then. Her menstrual cycles are regular, and she reports no breast leakage or engorgement. She has not noticed any changes in her hand size. She does not experience excessive sweating at night or during the day, but she does have slightly oily skin. She developed skin tags during her pregnancy, some of which have resolved while others persist. She has been losing weight and reports normal bowel movements. She does not feel colder than usual. She is not aware of any family history of prolactin issues. She wears compression socks to manage foot swelling.     SOCIAL HISTORY  She does not smoke. She works in construction, building

## 2025-08-26 LAB
BUN BLDV-MCNC: 14 MG/DL
CALCIUM SERPL-MCNC: 9.1 MG/DL
CHLORIDE BLD-SCNC: 101 MMOL/L
CO2: 28 MMOL/L
CREAT SERPL-MCNC: 0.84 MG/DL
EGFR: >60
GLUCOSE BLD-MCNC: 93 MG/DL
POTASSIUM SERPL-SCNC: 4.3 MMOL/L
SODIUM BLD-SCNC: 136 MMOL/L